# Patient Record
Sex: MALE | Race: WHITE | Employment: FULL TIME
[De-identification: names, ages, dates, MRNs, and addresses within clinical notes are randomized per-mention and may not be internally consistent; named-entity substitution may affect disease eponyms.]

---

## 2024-08-27 ENCOUNTER — TELEPHONE (OUTPATIENT)
Dept: FAMILY MEDICINE CLINIC | Facility: CLINIC | Age: 44
End: 2024-08-27

## 2024-08-27 NOTE — TELEPHONE ENCOUNTER
----- Message from Jelania D sent at 8/26/2024 11:43 AM EDT -----  Regarding: E[facility] Message to Provider  E[facility] Message to Provider    Relationship to Patient: Self     Additional Information Patient wanted to know if Dr. Indra Kitchen is accepting [employer] since he has upcoming new patient appointment on 8/29/2024.  --------------------------------------------------------------------------------------------------------------------------    Call Back Information: OK to leave message on voicemail  Preferred Call Back Number: Phone [phone]

## 2024-08-29 ENCOUNTER — OFFICE VISIT (OUTPATIENT)
Dept: FAMILY MEDICINE CLINIC | Facility: CLINIC | Age: 44
End: 2024-08-29
Payer: COMMERCIAL

## 2024-08-29 VITALS
SYSTOLIC BLOOD PRESSURE: 117 MMHG | HEART RATE: 60 BPM | RESPIRATION RATE: 12 BRPM | BODY MASS INDEX: 22.61 KG/M2 | OXYGEN SATURATION: 98 % | DIASTOLIC BLOOD PRESSURE: 72 MMHG | TEMPERATURE: 98.7 F | WEIGHT: 176.2 LBS | HEIGHT: 74 IN

## 2024-08-29 DIAGNOSIS — M54.6 CHRONIC MIDLINE THORACIC BACK PAIN: Primary | ICD-10-CM

## 2024-08-29 DIAGNOSIS — M54.50 BACK PAIN, LUMBOSACRAL: ICD-10-CM

## 2024-08-29 DIAGNOSIS — G89.29 CHRONIC MIDLINE THORACIC BACK PAIN: Primary | ICD-10-CM

## 2024-08-29 DIAGNOSIS — Z11.59 ENCOUNTER FOR HEPATITIS C SCREENING TEST FOR LOW RISK PATIENT: ICD-10-CM

## 2024-08-29 DIAGNOSIS — E78.5 DYSLIPIDEMIA: ICD-10-CM

## 2024-08-29 DIAGNOSIS — M25.552 LEFT HIP PAIN: ICD-10-CM

## 2024-08-29 PROCEDURE — G8420 CALC BMI NORM PARAMETERS: HCPCS | Performed by: FAMILY MEDICINE

## 2024-08-29 PROCEDURE — 4004F PT TOBACCO SCREEN RCVD TLK: CPT | Performed by: FAMILY MEDICINE

## 2024-08-29 PROCEDURE — 99203 OFFICE O/P NEW LOW 30 MIN: CPT | Performed by: FAMILY MEDICINE

## 2024-08-29 PROCEDURE — G8427 DOCREV CUR MEDS BY ELIG CLIN: HCPCS | Performed by: FAMILY MEDICINE

## 2024-08-29 ASSESSMENT — PATIENT HEALTH QUESTIONNAIRE - PHQ9
SUM OF ALL RESPONSES TO PHQ QUESTIONS 1-9: 0
1. LITTLE INTEREST OR PLEASURE IN DOING THINGS: NOT AT ALL
2. FEELING DOWN, DEPRESSED OR HOPELESS: NOT AT ALL
SUM OF ALL RESPONSES TO PHQ QUESTIONS 1-9: 0
SUM OF ALL RESPONSES TO PHQ9 QUESTIONS 1 & 2: 0

## 2024-08-29 NOTE — PROGRESS NOTES
Subjective:   Scar Lopez is a 43 y.o. male presents today with medical problems and to obtain refills if necessary, and they are also meeting me as their new physician for the first time as their initial visit today for unusual recurrences of back pain.  This happens about twice a year usually for about 7 days.  Most recently lasted a little more than 7 days.  There is no known trigger.  Not related to exercise.  Tends to occur in the same area left lower thoracic upper lumbar paraspinous muscle area.  Never runs into the flank.  No urinary symptoms.  Every once a while it can happen on the right side but is usually the left side.  Will resolve on its own after about a week.  There has not been any fever, chills, night sweats or weight loss.  There is no lower extremity paresthesias, weakness or pain but he does have left hip pain which seems to be associated with the discomforts on occasion but not every time    No Known Allergies  PMH, MEDS reviewed  PHQ-9 Total Score: 0 (8/29/2024  2:18 PM)       Objective:   Blood pressure 117/72, pulse 60, temperature 98.7 °F (37.1 °C), temperature source Temporal, resp. rate 12, height 1.88 m (6' 2\"), weight 79.9 kg (176 lb 3.2 oz), SpO2 98%.  General- Pleasant and no distress  Psych- alert and oriented to person, place and time  Mood and affect are appropriate to situation  Musculoskeletal - Gait and station examination reveals mid-position with no abnormalities.  Neurological- grossly intact.   rrr s mrg.   bcta  No current thoracic or lumbar spinous process tenderness.  No para-spnous lumbosacral or  Thoracic para-spinous muscle tenderness.  No flank tenderness.  Left hip rom ok  Abdomen is soft nontender nondistended without HSM or masses detected.    204 reflexes right knee 3/4 left.  Ankles 2/4 and symmetric bilaterally.  SLR is negative bilaterally    Assessment/Plan:     1. Chronic midline thoracic back pain    2. Encounter for hepatitis C screening test  for low risk patient    3. Dyslipidemia    4. Back pain, lumbosacral    5. Left hip pain         1. Chronic midline thoracic back pain  -     Basic Metabolic Panel; Future  -     XR THORACIC SPINE (2 VIEWS); Future  2. Encounter for hepatitis C screening test for low risk patient  -     Hepatitis C Antibody; Future  3. Dyslipidemia  -     Lipid Panel; Future  4. Back pain, lumbosacral  -     Basic Metabolic Panel; Future  -     XR LUMBAR SPINE (2-3 VIEWS); Future  5. Left hip pain  -     XR HIP LEFT (2-3 VIEWS); Future    I suspect the x-rays will be normal if not maybe mild arthritis.  Stretching, posture, exercise recommended.  Will follow-up on labs.  Will need to begin annual prostate checks at age 46 and screening colonoscopy.  Followup:   Return if symptoms worsen or fail to improve.

## 2024-08-30 LAB
ANION GAP SERPL CALC-SCNC: 9 MMOL/L (ref 9–18)
BUN SERPL-MCNC: 13 MG/DL (ref 6–23)
CALCIUM SERPL-MCNC: 10.1 MG/DL (ref 8.8–10.2)
CHLORIDE SERPL-SCNC: 101 MMOL/L (ref 98–107)
CHOLEST SERPL-MCNC: 193 MG/DL (ref 0–200)
CO2 SERPL-SCNC: 30 MMOL/L (ref 20–28)
CREAT SERPL-MCNC: 0.94 MG/DL (ref 0.8–1.3)
GLUCOSE SERPL-MCNC: 97 MG/DL (ref 70–99)
HCV AB SER QL: NONREACTIVE
HDLC SERPL-MCNC: 62 MG/DL (ref 40–60)
HDLC SERPL: 3.1 (ref 0–5)
LDLC SERPL CALC-MCNC: 105 MG/DL (ref 0–100)
POTASSIUM SERPL-SCNC: 4.6 MMOL/L (ref 3.5–5.1)
SODIUM SERPL-SCNC: 140 MMOL/L (ref 136–145)
TRIGL SERPL-MCNC: 130 MG/DL (ref 0–150)
VLDLC SERPL CALC-MCNC: 26 MG/DL (ref 6–23)